# Patient Record
Sex: MALE | Race: BLACK OR AFRICAN AMERICAN | Employment: FULL TIME | URBAN - METROPOLITAN AREA
[De-identification: names, ages, dates, MRNs, and addresses within clinical notes are randomized per-mention and may not be internally consistent; named-entity substitution may affect disease eponyms.]

---

## 2022-11-17 ENCOUNTER — OFFICE VISIT (OUTPATIENT)
Dept: INFECTIOUS DISEASES | Age: 32
End: 2022-11-17

## 2022-11-17 VITALS
DIASTOLIC BLOOD PRESSURE: 71 MMHG | SYSTOLIC BLOOD PRESSURE: 128 MMHG | WEIGHT: 190.6 LBS | HEART RATE: 56 BPM | TEMPERATURE: 96.8 F | RESPIRATION RATE: 18 BRPM | BODY MASS INDEX: 22.5 KG/M2 | HEIGHT: 77 IN

## 2022-11-17 DIAGNOSIS — Z22.7 LATENT TUBERCULOSIS BY BLOOD TEST: Primary | ICD-10-CM

## 2022-11-17 PROCEDURE — 99203 OFFICE O/P NEW LOW 30 MIN: CPT | Performed by: INTERNAL MEDICINE

## 2022-11-17 RX ORDER — ISONIAZID 300 MG/1
900 TABLET ORAL WEEKLY
Qty: 36 TABLET | Refills: 0 | Status: SHIPPED | OUTPATIENT
Start: 2022-11-17 | End: 2023-02-03

## 2022-11-17 NOTE — PROGRESS NOTES
11/19/2022         Referring Physician: ROBINSON Luong NP  Primary Care Physician: No primary care provider on file. Impression/Plan:   Diagnosis Orders   1. 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 He has elected a 12 dose course of INH  And RIF. Return in about 4 weeks (around 12/15/2022). History: Ella Berger is a 27 y.o. Afro-Samoan male presenting today for positive QuantiFERON test.  Test was done for pre-employment screening at [address] [place] [address]. Follow-up chest x-ray did not show any active lung lesions. Denies SOB, cough, wt loss, or night sweats. He was born in [facility] and moved to the [address] [place] [address],[address] in 2014. 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 He cannot remember being around anyone diagnosed with pulmonary TB . He is  and has a 3year-old son. No family hx of cancer or TB. Review of Systems   All other systems reviewed and are negative. 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 No past medical history on file. No past surgical history on file.     Social History     Socioeconomic History    Marital status:      Spouse name: Not on file    Number of children: Not on file    Years of education: Not on file    Highest education level: Not on file   Occupational History    Not on file   Tobacco Use    Smoking status: Never Smokeless tobacco: Never   Vaping Use    Vaping Use: Never used   Substance and Sexual Activity    Alcohol use: Yes     Alcohol/week: 1.0 standard drink     Types: 1 Cans of beer per week    Drug use: Never    Sexual activity: Not on file   Other Topics Concern    Not on file   Social History Narrative    Not on file     Social Determinants of Health     Financial Resource Strain: Not on file   Food Insecurity: Not on file   Transportation Needs: Not on file   Physical Activity: Not on file   Stress: Not on file   Social Connections: Not on file   Intimate Partner Violence: Not on file   Housing Stability: Not on file       Family History   Problem Relation Age of Onset    High Blood Pressure Maternal Grandmother        Vital Signs:  Vitals:    11/17/22 1214   BP: 128/71   Site: Right Upper Arm   Position: Sitting   Cuff Size: Medium Adult   Pulse: 56   Resp: 18   Temp: 96.8 °F (36 °C)   TempSrc: Infrared   Weight: 190 lb 9.6 oz (86.5 kg)   Height: 6' 5\" (1.956 m)        Wt Readings from Last 3 Encounters:   11/17/22 190 lb 9.6 oz (86.5 kg)        Physical Exam:   Gen: alert and NAD  HEENT: sclera clear, pupils equal and reactive, extra ocular muscles intact, oropharynx clear, mucus membranes moist, tympanic membranes clear bilaterally, no cervical lymphadenopathy noted, and neck supple  Neck: supple, no significant adenopathy  Chest: clear to auscultation, no wheezes, rales or rhonchi, symmetric air entry  Heart: regular rate and rhythm, no murmurs  ABD: abdomen is soft without significant tenderness, masses, organomegaly or guarding.   EXT:peripheral pulses normal, no pedal edema, no clubbing or cyanosis  NEURO: alert, oriented, normal speech, no focal findings or movement disorder noted  Skin: well hydrated, no lesions, surgical site examined  Wounds: none,  Labs:   No results found for: WBC, CREATININE    Cultures:  No results found for: CULTURE    Imaging Studies:   CXR       Electronicallysigned by Gely Moody Shani Stafford MD on 11/17/22 at 8:50 AM EST

## 2022-11-19 PROBLEM — Z22.7 LATENT TUBERCULOSIS BY BLOOD TEST: Status: ACTIVE | Noted: 2022-11-19

## 2022-11-19 NOTE — ASSESSMENT & PLAN NOTE
Discussed with him about diagnosis and offered different treatment options. He has elected a 12 dose course of INH  And RIF.

## 2022-12-06 LAB
A/G RATIO: 2
ALBUMIN SERPL-MCNC: 4.8 G/DL
ALP BLD-CCNC: 71 U/L
ALT SERPL-CCNC: 33 U/L
AST SERPL-CCNC: 22 U/L
BILIRUB SERPL-MCNC: 0.5 MG/DL (ref 0.1–1.4)
BILIRUBIN DIRECT: 0.2 MG/DL
BILIRUBIN, INDIRECT: 0.3
GLOBULIN: 2.4
PROTEIN TOTAL: 7.2 G/DL

## 2023-01-04 ENCOUNTER — OFFICE VISIT (OUTPATIENT)
Dept: INFECTIOUS DISEASES | Age: 33
End: 2023-01-04

## 2023-01-04 VITALS
WEIGHT: 190.8 LBS | RESPIRATION RATE: 18 BRPM | DIASTOLIC BLOOD PRESSURE: 60 MMHG | HEART RATE: 61 BPM | TEMPERATURE: 97.2 F | SYSTOLIC BLOOD PRESSURE: 106 MMHG | BODY MASS INDEX: 22.63 KG/M2

## 2023-01-04 DIAGNOSIS — Z22.7 LATENT TUBERCULOSIS BY BLOOD TEST: Primary | ICD-10-CM

## 2023-01-04 NOTE — PROGRESS NOTES
1/8/2023         Referring Physician: No ref. provider found  Primary Care Physician: No primary care provider on file. Impression/Plan:   Diagnosis Orders   1. 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 Liver enzymes within normal limits. Return in about 4 weeks (around 2/1/2023). History: Zaheer Kimbrough is a 27 y.o. Afro-Armenian male presenting today for positive QuantiFERON test.  Test was done for pre-employment screening at [address] [place] [address]. Follow-up chest x-ray did not show any active lung lesions. Denies SOB, cough, wt loss, or night sweats. He was born in [facility] and moved to the [address] [place] [address],[address] in 2014. 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 He cannot remember being around anyone diagnosed with pulmonary TB . He is  and has a 3year-old son. No family hx of cancer or TB.  1/4/2023: Started meds on 11/21/2022. probable end-date is 2/6/23. tolerating abx. Denies n/v/d/f/c. Reports that he has an increased urge to urinate on the days that he takes the medication. Review of Systems   All other systems reviewed and are negative. 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 No past medical history on file. No past surgical history on file.     Social History     Socioeconomic History    Marital status:      Spouse name: Not on file    Number of children: Not on file    Years of education: Not on file    Highest education level: Not on file   Occupational History    Not on file Tobacco Use    Smoking status: Never    Smokeless tobacco: Never   Vaping Use    Vaping Use: Never used   Substance and Sexual Activity    Alcohol use: Yes     Alcohol/week: 1.0 standard drink     Types: 1 Cans of beer per week    Drug use: Never    Sexual activity: Not on file   Other Topics Concern    Not on file   Social History Narrative    Not on file     Social Determinants of Health     Financial Resource Strain: Not on file   Food Insecurity: Not on file   Transportation Needs: Not on file   Physical Activity: Not on file   Stress: Not on file   Social Connections: Not on file   Intimate Partner Violence: Not on file   Housing Stability: Not on file       Family History   Problem Relation Age of Onset    High Blood Pressure Maternal Grandmother        Vital Signs:  Vitals:    01/04/23 1118   BP: 106/60   Site: Left Upper Arm   Position: Sitting   Cuff Size: Medium Adult   Pulse: 61   Resp: 18   Temp: 97.2 °F (36.2 °C)   TempSrc: Infrared   Weight: 190 lb 12.8 oz (86.5 kg)        Wt Readings from Last 3 Encounters:   01/04/23 190 lb 12.8 oz (86.5 kg)   11/17/22 190 lb 9.6 oz (86.5 kg)        Physical Exam:   Gen: alert and NAD  HEENT: sclera clear, pupils equal and reactive, extra ocular muscles intact, oropharynx clear, mucus membranes moist, tympanic membranes clear bilaterally, no cervical lymphadenopathy noted, and neck supple  Neck: supple, no significant adenopathy  Chest: clear to auscultation, no wheezes, rales or rhonchi, symmetric air entry  Heart: regular rate and rhythm, no murmurs  ABD: abdomen is soft without significant tenderness, masses, organomegaly or guarding.   EXT:peripheral pulses normal, no pedal edema, no clubbing or cyanosis  NEURO: alert, oriented, normal speech, no focal findings or movement disorder noted  Skin: well hydrated, no lesions, surgical site examined  Wounds: none,  Labs:   No results found for: WBC, CREATININE    Cultures:  No results found for: CULTURE    Imaging Studies:   CXR

## 2023-01-30 LAB
A/G RATIO: 1.8
ALBUMIN SERPL-MCNC: 4.4 G/DL
ALP BLD-CCNC: 59 U/L
ALT SERPL-CCNC: 14 U/L
AST SERPL-CCNC: 15 U/L
BILIRUB SERPL-MCNC: 0.2 MG/DL (ref 0.1–1.4)
BILIRUBIN DIRECT: 0.1 MG/DL
BILIRUBIN, INDIRECT: 0
GLOBULIN: 2.5
PROTEIN TOTAL: 6.9 G/DL

## 2023-02-01 ENCOUNTER — OFFICE VISIT (OUTPATIENT)
Dept: INFECTIOUS DISEASES | Age: 33
End: 2023-02-01

## 2023-02-01 VITALS
BODY MASS INDEX: 23.03 KG/M2 | WEIGHT: 194.2 LBS | TEMPERATURE: 97.8 F | DIASTOLIC BLOOD PRESSURE: 64 MMHG | HEART RATE: 53 BPM | SYSTOLIC BLOOD PRESSURE: 114 MMHG | RESPIRATION RATE: 18 BRPM

## 2023-02-01 DIAGNOSIS — Z22.7 LATENT TUBERCULOSIS BY BLOOD TEST: Primary | ICD-10-CM

## 2023-02-01 NOTE — ASSESSMENT & PLAN NOTE
Labs reviewed. Liver enzymes are within normal. Scheduled to complete INH and rifapentine. Summary written for him in discharge instructions.

## 2023-02-01 NOTE — PATIENT INSTRUCTIONS
Completed 12 dose course of rifapentine and isoniazid. Quantiferon test will remain positive. You do not need to be re-treated. Repeat treatment would be warranted if you are exposed to a patient with pulmonary tuberculosis.